# Patient Record
Sex: FEMALE | Race: WHITE | NOT HISPANIC OR LATINO | Employment: FULL TIME | URBAN - NONMETROPOLITAN AREA
[De-identification: names, ages, dates, MRNs, and addresses within clinical notes are randomized per-mention and may not be internally consistent; named-entity substitution may affect disease eponyms.]

---

## 2020-02-03 ENCOUNTER — TRANSCRIBE ORDERS (OUTPATIENT)
Dept: GENERAL RADIOLOGY | Facility: HOSPITAL | Age: 25
End: 2020-02-03

## 2020-02-03 ENCOUNTER — HOSPITAL ENCOUNTER (OUTPATIENT)
Dept: GENERAL RADIOLOGY | Facility: HOSPITAL | Age: 25
Discharge: HOME | End: 2020-02-03
Admitting: NURSE PRACTITIONER

## 2020-02-03 DIAGNOSIS — R07.9 CHEST PAIN, UNSPECIFIED TYPE: ICD-10-CM

## 2020-02-03 DIAGNOSIS — R07.9 CHEST PAIN, UNSPECIFIED TYPE: Primary | ICD-10-CM

## 2020-02-03 PROCEDURE — 71046 X-RAY EXAM CHEST 2 VIEWS: CPT

## 2020-02-04 ENCOUNTER — TRANSCRIBE ORDERS (OUTPATIENT)
Dept: ADMINISTRATIVE | Facility: HOSPITAL | Age: 25
End: 2020-02-04

## 2020-02-04 DIAGNOSIS — R07.9 CHEST PAIN, UNSPECIFIED TYPE: Primary | ICD-10-CM

## 2021-04-01 ENCOUNTER — OFFICE VISIT (OUTPATIENT)
Dept: OBSTETRICS AND GYNECOLOGY | Facility: CLINIC | Age: 26
End: 2021-04-01

## 2021-04-01 VITALS
WEIGHT: 293 LBS | BODY MASS INDEX: 44.41 KG/M2 | DIASTOLIC BLOOD PRESSURE: 82 MMHG | HEIGHT: 68 IN | SYSTOLIC BLOOD PRESSURE: 142 MMHG

## 2021-04-01 DIAGNOSIS — L70.9 ACNE, UNSPECIFIED ACNE TYPE: ICD-10-CM

## 2021-04-01 DIAGNOSIS — Z01.419 WELL WOMAN EXAM WITH ROUTINE GYNECOLOGICAL EXAM: Primary | ICD-10-CM

## 2021-04-01 DIAGNOSIS — E55.9 VITAMIN D DEFICIENCY: ICD-10-CM

## 2021-04-01 DIAGNOSIS — N91.2 AMENORRHEA: ICD-10-CM

## 2021-04-01 DIAGNOSIS — L68.0 FEMALE HIRSUTISM: ICD-10-CM

## 2021-04-01 PROCEDURE — 99213 OFFICE O/P EST LOW 20 MIN: CPT | Performed by: NURSE PRACTITIONER

## 2021-04-01 PROCEDURE — 99395 PREV VISIT EST AGE 18-39: CPT | Performed by: NURSE PRACTITIONER

## 2021-04-01 PROCEDURE — G0123 SCREEN CERV/VAG THIN LAYER: HCPCS | Performed by: NURSE PRACTITIONER

## 2021-04-01 NOTE — PROGRESS NOTES
"Bruce Del Castillo is a 25 y.o. female    Patient is here today as a new patient.  She has not had a period since October.  She denies being sexually active.  She has never had a Pap smear on exam.  She states her periods have always been irregular but never this long in between.    Gynecologic Exam  The patient's pertinent negatives include no pelvic pain, vaginal bleeding or vaginal discharge. The patient is experiencing no pain. Pertinent negatives include no abdominal pain, anorexia, back pain, chills, constipation, diarrhea, discolored urine, dysuria, fever, flank pain, frequency, headaches, hematuria, joint pain, joint swelling, nausea, painful intercourse, rash, sore throat, urgency or vomiting. She is not sexually active. Her menstrual history has been irregular.         /82   Ht 172.7 cm (68\")   Wt (!) 138 kg (305 lb)   LMP 10/01/2020 (Approximate)   Breastfeeding No   BMI 46.38 kg/m²     Outpatient Encounter Medications as of 4/1/2021   Medication Sig Dispense Refill   • escitalopram (LEXAPRO) 20 MG tablet Take 1 tablet by mouth daily 30 tablet 2   • [DISCONTINUED] escitalopram (LEXAPRO) 20 MG tablet Take 1 tablet by mouth daily 30 tablet 3   • [DISCONTINUED] escitalopram (LEXAPRO) 20 MG tablet Take 1 tablet by mouth daily 30 tablet 3     No facility-administered encounter medications on file as of 4/1/2021.       Surgical History  Past Surgical History:   Procedure Laterality Date   • NASAL SINUS SURGERY      as a child       Family History  Family History   Problem Relation Age of Onset   • Diabetes Father    • Hypertension Mother    • Breast cancer Neg Hx    • Ovarian cancer Neg Hx    • Uterine cancer Neg Hx    • Colon cancer Neg Hx    • Melanoma Neg Hx    • Stroke Neg Hx    • Osteoporosis Neg Hx    • Prostate cancer Neg Hx        The following portions of the patient's history were reviewed and updated as appropriate: allergies, current medications, past family history, past " medical history, past social history, past surgical history and problem list.    Review of Systems   Constitutional: Positive for unexpected weight gain. Negative for activity change, appetite change, chills, diaphoresis, fatigue, fever and unexpected weight loss.   HENT: Negative for congestion, dental problem, drooling, ear discharge, ear pain, facial swelling, hearing loss, mouth sores, nosebleeds, postnasal drip, rhinorrhea, sinus pressure, sneezing, sore throat, swollen glands, tinnitus, trouble swallowing and voice change.    Eyes: Negative for blurred vision, double vision, photophobia, pain, discharge, redness, itching and visual disturbance.   Respiratory: Negative for apnea, cough, choking, chest tightness, shortness of breath, wheezing and stridor.    Cardiovascular: Negative for chest pain, palpitations and leg swelling.   Gastrointestinal: Negative for abdominal distention, abdominal pain, anal bleeding, anorexia, blood in stool, constipation, diarrhea, nausea, rectal pain, vomiting, GERD and indigestion.   Endocrine: Negative for cold intolerance, heat intolerance, polydipsia, polyphagia and polyuria.   Genitourinary: Positive for menstrual problem. Negative for amenorrhea, breast discharge, breast lump, breast pain, decreased libido, decreased urine volume, difficulty urinating, dyspareunia, dysuria, flank pain, frequency, genital sores, hematuria, pelvic pain, pelvic pressure, urgency, urinary incontinence, vaginal bleeding, vaginal discharge and vaginal pain.   Musculoskeletal: Negative for arthralgias, back pain, gait problem, joint pain, joint swelling, myalgias, neck pain, neck stiffness and bursitis.   Skin: Negative for color change, dry skin, pallor, rash, skin lesions and bruise.   Allergic/Immunologic: Negative for environmental allergies, food allergies and immunocompromised state.   Neurological: Negative for dizziness, tremors, seizures, syncope, facial asymmetry, speech difficulty,  weakness, light-headedness, numbness, headache, memory problem and confusion.   Hematological: Negative for adenopathy. Does not bruise/bleed easily.   Psychiatric/Behavioral: Negative for agitation, behavioral problems, decreased concentration, dysphoric mood, hallucinations, self-injury, sleep disturbance, suicidal ideas, negative for hyperactivity, depressed mood and stress. The patient is not nervous/anxious.        Objective   Physical Exam  Vitals and nursing note reviewed. Exam conducted with a chaperone present.   Constitutional:       General: She is not in acute distress.     Appearance: She is well-developed. She is not diaphoretic.   HENT:      Head: Normocephalic.      Right Ear: External ear normal.      Left Ear: External ear normal.      Nose: Nose normal.   Eyes:      General: No scleral icterus.        Right eye: No discharge.         Left eye: No discharge.      Conjunctiva/sclera: Conjunctivae normal.      Pupils: Pupils are equal, round, and reactive to light.   Neck:      Thyroid: No thyromegaly.      Vascular: No carotid bruit.      Trachea: No tracheal deviation.   Cardiovascular:      Rate and Rhythm: Normal rate and regular rhythm.      Heart sounds: Normal heart sounds. No murmur heard.     Pulmonary:      Effort: Pulmonary effort is normal. No respiratory distress.      Breath sounds: Normal breath sounds. No wheezing.   Chest:      Breasts: Breasts are symmetrical.         Right: Normal. No swelling, bleeding, inverted nipple, mass, nipple discharge, skin change or tenderness.         Left: Normal. No swelling, bleeding, inverted nipple, mass, nipple discharge, skin change or tenderness.   Abdominal:      General: There is no distension.      Palpations: Abdomen is soft. There is no mass.      Tenderness: There is no abdominal tenderness. There is no right CVA tenderness, left CVA tenderness or guarding.      Hernia: No hernia is present. There is no hernia in the left inguinal area or  right inguinal area.   Genitourinary:     General: Normal vulva.      Exam position: Lithotomy position.      Labia:         Right: No rash, tenderness, lesion or injury.         Left: No rash, tenderness, lesion or injury.       Vagina: Normal. No signs of injury and foreign body. No vaginal discharge, erythema, tenderness or bleeding.      Cervix: Normal.      Uterus: Normal. Not enlarged, not fixed and not tender.       Adnexa: Right adnexa normal and left adnexa normal.        Right: No mass, tenderness or fullness.          Left: No mass, tenderness or fullness.        Rectum: Normal. No mass.      Comments:   BSU normal  Urethral meatus  Normal  Perineum  Normal  Musculoskeletal:         General: No tenderness. Normal range of motion.      Cervical back: Normal range of motion and neck supple.   Lymphadenopathy:      Head:      Right side of head: No submental, submandibular, tonsillar, preauricular, posterior auricular or occipital adenopathy.      Left side of head: No submental, submandibular, tonsillar, preauricular, posterior auricular or occipital adenopathy.      Cervical: No cervical adenopathy.      Right cervical: No superficial, deep or posterior cervical adenopathy.     Left cervical: No superficial, deep or posterior cervical adenopathy.      Upper Body:      Right upper body: No supraclavicular, axillary or pectoral adenopathy.      Left upper body: No supraclavicular, axillary or pectoral adenopathy.      Lower Body: No right inguinal adenopathy. No left inguinal adenopathy.   Skin:     General: Skin is warm and dry.      Findings: No bruising, erythema or rash.          Neurological:      Mental Status: She is alert and oriented to person, place, and time.      Coordination: Coordination normal.   Psychiatric:         Mood and Affect: Mood normal.         Behavior: Behavior normal.         Thought Content: Thought content normal.         Judgment: Judgment normal.         Assessment/Plan      Diagnoses and all orders for this visit:    1. Well woman exam with routine gynecological exam (Primary)  Normal GYN exam. Will RTO for lab work. Encouraged SBE, pt is aware how to do self breast exam and the importance of same. Discussed weight management and importance of maintaining a healthy weight. Discussed Vitamin D intake and the importance of adequate vitamin D for both Bone Health and a healthy immune system.  Discussed Daily exercise and the importance of same, in regards to a healthy heart as well as helping to maintain her weight and improving her mental health.  BMI 46.4.  Pap smear is done per ASCCP guidelines.  -     Liquid-based Pap Smear, Screening  -     CBC & Differential  -     Lipid Panel With LDL / HDL Ratio  -     Comprehensive Metabolic Panel  -     TSH  -     T3, Uptake  -     T4, Free  -     Hemoglobin A1c  -     Urine Culture - , Urine, Clean Catch  -     UA / M With / Rflx Culture([facility] ONLY) - Urine, Clean Catch  -     Hepatitis C Antibody    2. Vitamin D deficiency  Comments:  Patient will return for blood work.  Orders:  -     Vitamin D 25 Hydroxy    3. Amenorrhea  Comments:  Will return for fasting lab work and will also have pelvic ultrasound done.  Orders:  -     Insulin, Total  -     Follicle Stimulating Hormone  -     Luteinizing Hormone  -     Cortisol  -     DHEA-Sulfate  -     Testosterone  -     Progesterone  -     Estradiol    4. Acne, unspecified acne type  Comments:  She has significant cystic acne.  She will return for blood work.  We briefly discussed Aldactone and metformin.    5. Female hirsutism  Comments:  Patient has significant hirsutism in her neck and face, as well as her arms.         Patient's Body mass index is 46.38 kg/m². BMI is above normal parameters. Recommendations include: educational material, exercise counseling and nutrition counseling.      Mary Grace Costello, APRN  4/1/2021

## 2021-04-01 NOTE — PATIENT INSTRUCTIONS
"BMI for Adults  What is BMI?  Body mass index (BMI) is a number that is calculated from a person's weight and height. BMI can help estimate how much of a person's weight is composed of fat. BMI does not measure body fat directly. Rather, it is an alternative to procedures that directly measure body fat, which can be difficult and expensive.  BMI can help identify people who may be at higher risk for certain medical problems.  What are BMI measurements used for?  BMI is used as a screening tool to identify possible weight problems. It helps determine whether a person is obese, overweight, a healthy weight, or underweight.  BMI is useful for:  · Identifying a weight problem that may be related to a medical condition or may increase the risk for medical problems.  · Promoting changes, such as changes in diet and exercise, to help reach a healthy weight. BMI screening can be repeated to see if these changes are working.  How is BMI calculated?  BMI involves measuring your weight in relation to your height. Both height and weight are measured, and the BMI is calculated from those numbers. This can be done either in English ([place] or metric measurements. Note that charts and online BMI calculators are available to help you find your BMI quickly and easily without having to do these calculations yourself.  To calculate your BMI in English ([place] measurements:    1. Measure your weight in pounds (lb).  2. Multiply the number of pounds by 703.  ? For example, for a person who weighs 180 lb, multiply that number by 703, which equals 126,540.  3. Measure your height in inches. Then multiply that number by itself to get a measurement called \"inches squared.\"  ? For example, for a person who is 70 inches tall, the \"inches squared\" measurement is 70 inches x 70 inches, which equals 4,900 inches squared.  4. Divide the total from step 2 (number of lb x 703) by the total from step 3 (inches squared): 126,540 ÷ 4,900 = 25.8. This is " "your BMI.  To calculate your BMI in metric measurements:  1. Measure your weight in kilograms (kg).  2. Measure your height in meters (m). Then multiply that number by itself to get a measurement called \"meters squared.\"  ? For example, for a person who is 1.75 m tall, the \"meters squared\" measurement is 1.75 m x 1.75 m, which is equal to 3.1 meters squared.  3. Divide the number of kilograms (your weight) by the meters squared number. In this example: 70 ÷ 3.1 = 22.6. This is your BMI.  What do the results mean?  BMI charts are used to identify whether you are underweight, normal weight, overweight, or obese. The following guidelines will be used:  · Underweight: BMI less than 18.5.  · Normal weight: BMI between 18.5 and 24.9.  · Overweight: BMI between 25 and 29.9.  · Obese: BMI of 30 or above.  Keep these notes in mind:  · Weight includes both fat and muscle, so someone with a muscular build, such as an athlete, may have a BMI that is higher than 24.9. In cases like these, BMI is not an accurate measure of body fat.  · To determine if excess body fat is the cause of a BMI of 25 or higher, further assessments may need to be done by a health care provider.  · BMI is usually interpreted in the same way for men and women.  Where to find more information  For more information about BMI, including tools to quickly calculate your BMI, go to these websites:  · Centers for Disease Control and Prevention: www.cdc.gov  · American Heart Association: www.heart.org  · National Heart, Lung, and Blood [place]: www.nhlbi.nih.gov  Summary  · Body mass index (BMI) is a number that is calculated from a person's weight and height.  · BMI may help estimate how much of a person's weight is composed of fat. BMI can help identify those who may be at higher risk for certain medical problems.  · BMI can be measured using English measurements or metric measurements.  · BMI charts are used to identify whether you are underweight, normal " weight, overweight, or obese.  This information is not intended to replace advice given to you by your health care provider. Make sure you discuss any questions you have with your health care provider.  Document Revised: 09/09/2020 Document Reviewed: 07/17/2020  [employer] Patient Education © 2021 [employer]

## 2021-04-05 LAB
GEN CATEG CVX/VAG CYTO-IMP: NORMAL
LAB AP CASE REPORT: NORMAL
LAB AP GYN ADDITIONAL INFORMATION: NORMAL
PATH INTERP SPEC-IMP: NORMAL
STAT OF ADQ CVX/VAG CYTO-IMP: NORMAL

## 2021-04-07 PROCEDURE — U0004 COV-19 TEST NON-CDC HGH THRU: HCPCS | Performed by: FAMILY MEDICINE

## 2021-04-09 ENCOUNTER — TELEPHONE (OUTPATIENT)
Dept: URGENT CARE | Facility: CLINIC | Age: 26
End: 2021-04-09

## 2021-04-19 PROCEDURE — 80053 COMPREHEN METABOLIC PANEL: CPT | Performed by: FAMILY MEDICINE

## 2021-04-19 PROCEDURE — 85025 COMPLETE CBC W/AUTO DIFF WBC: CPT | Performed by: FAMILY MEDICINE

## 2021-04-19 PROCEDURE — 84466 ASSAY OF TRANSFERRIN: CPT | Performed by: FAMILY MEDICINE

## 2021-04-19 PROCEDURE — 82150 ASSAY OF AMYLASE: CPT | Performed by: FAMILY MEDICINE

## 2021-04-19 PROCEDURE — 83690 ASSAY OF LIPASE: CPT | Performed by: FAMILY MEDICINE

## 2021-04-19 PROCEDURE — 83540 ASSAY OF IRON: CPT | Performed by: FAMILY MEDICINE

## 2021-04-20 ENCOUNTER — OFFICE VISIT (OUTPATIENT)
Dept: FAMILY MEDICINE CLINIC | Facility: CLINIC | Age: 26
End: 2021-04-20

## 2021-04-20 VITALS
WEIGHT: 293 LBS | OXYGEN SATURATION: 99 % | TEMPERATURE: 98 F | HEIGHT: 68 IN | HEART RATE: 89 BPM | SYSTOLIC BLOOD PRESSURE: 120 MMHG | DIASTOLIC BLOOD PRESSURE: 80 MMHG | BODY MASS INDEX: 44.41 KG/M2

## 2021-04-20 DIAGNOSIS — J35.1 TONSILLAR HYPERTROPHY: ICD-10-CM

## 2021-04-20 DIAGNOSIS — R79.0 ABNORMAL IRON SATURATION: ICD-10-CM

## 2021-04-20 DIAGNOSIS — R42 DIZZINESS: ICD-10-CM

## 2021-04-20 DIAGNOSIS — R53.83 FATIGUE, UNSPECIFIED TYPE: ICD-10-CM

## 2021-04-20 DIAGNOSIS — E66.01 MORBID OBESITY WITH BODY MASS INDEX OF 45.0-49.9 IN ADULT (HCC): ICD-10-CM

## 2021-04-20 DIAGNOSIS — Z83.3 FAMILY HISTORY OF DIABETES MELLITUS IN FATHER: ICD-10-CM

## 2021-04-20 DIAGNOSIS — R11.0 NAUSEA: ICD-10-CM

## 2021-04-20 DIAGNOSIS — J31.0 RHINITIS, CHRONIC: ICD-10-CM

## 2021-04-20 DIAGNOSIS — F41.9 ANXIETY AND DEPRESSION: Primary | ICD-10-CM

## 2021-04-20 DIAGNOSIS — F32.A ANXIETY AND DEPRESSION: Primary | ICD-10-CM

## 2021-04-20 LAB
IRON 24H UR-MRATE: 45 MCG/DL (ref 37–145)
IRON SATN MFR SERPL: 11 % (ref 20–50)
TIBC SERPL-MCNC: 422 MCG/DL (ref 298–536)
TRANSFERRIN SERPL-MCNC: 283 MG/DL (ref 200–360)

## 2021-04-20 PROCEDURE — 99214 OFFICE O/P EST MOD 30 MIN: CPT | Performed by: FAMILY MEDICINE

## 2021-04-20 RX ORDER — FLUTICASONE PROPIONATE 50 MCG
2 SPRAY, SUSPENSION (ML) NASAL DAILY
Qty: 16 G | Refills: 2 | Status: SHIPPED | OUTPATIENT
Start: 2021-04-20

## 2021-04-20 NOTE — PROGRESS NOTES
Chief Complaint   Patient presents with   • Establish Care   • Nausea     onset 2 weeks   • Vomiting       History:  Gemma Del Castillo is a 25 y.o. female who comes in today for the issues listed above.  New pt at this clinic.   Nausea was intermittent and has become more every day in the past two weeks.  Vomiting started early April while on menses or just finishing up her period.  Saw Dr. Parker at  4/7/21 and then saw him again yesterday.  Being sick has caused her to have her work threaten her about getting fired.  This has added to her stress.  Worsened stress has made her have more GI upset.      Last vomiting was yesterday, dry heaving quality because she didn't eat that much.  Crackers will usually stay down.  Greasy stuff will worsen sx.  Not much in the way of abd pain in the past 2 weeks.    Meds reviewed with her today.  Has been on Lexapro for a year or two.  Still has anxiety spikes and depression is a little better with the lexapro. Lexapro doesn't make her nauseated; she is erratic on taking it though and sometimes it makes her more anxious if she is taking it.  She was on 50 mg Zoloft before that and it didn't help or gave her a bad reaction where she phonophobic almost.  May have some underlying tinnitus she thinks.    Zofran ODT is helping her keeping water down.    Hasn't started Protonix, which was rx'd yesterday at .    Has a lot of GERD sx mid chest.      Not a smoker; last ETOH intake was New Year's two years ago.      BP is good today but she has been told it's been high episodically in the past.  Never been on any meds for BP or been dx'd with HTN.    Mom takes meds for mood; pt unsure of what.  PU has schizophrenia.      Father has had DM as long as she can remember and has really severe asthma and is on steroids and breathing tx.    Pt denies BG issues.      She denies pregnancy and is not sexually active.  Menses come irregularly and had one in April but last before was that in  October.  She is going to [facility] at [facility] and may have PCOS and is going to have US tomorrow and a huge panel of labs which we reviewed together today.    Pt is unaware of any AR or food allergies.         Old records reviewed:   with Og Parker MD (04/07/2021) - N/V x 2 days, dx viral syndrome, COVID neg; /70   with Og Parker MD (04/19/2021) - GERD and intractable vomiting    Labs reviewed:  Yesterday at  had normal amylase and lipase, normal CBC, CMP had , normal values except for low creatinine of 0.51. UA normal.    Imaging/other tests reviewed:  XR Chest 2 View (02/03/2020 13:08)       HISTORY: Chest pain     CXR: 2 views the chest are obtained.     COMPARISON: 12/24/2012     FINDINGS: There is a slightly diminished level of inspiration with no  focal consolidation or edema. Cardiac mediastinal contours are normal.  No pleural effusion or pneumothorax. Calcified granuloma of the right  middle lobe. No pleural effusion or pneumothorax. No acute bony  pathology.     IMPRESSION:  1. No acute radiographic cardiopulmonary process.       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  Will start there tomorrow afternoon.  Was seeing a psychiatrist at [facility]; Jose Jaramillo was prescribing it.      ROS:  Review of Systems   Constitutional: Positive for fatigue. Negative for fever.   HENT: Negative for sore throat, swollen glands and trouble swallowing.         No dental problems    She has intermittent sensitivity to noices and tinnitus hum quality and she listens to loud music on headphones a lot.      Says hearing isn't so good.   Respiratory: Negative for shortness of breath.    Cardiovascular: Negative for palpitations.   Gastrointestinal: Positive for GERD. Negative for constipation and diarrhea.   Genitourinary: Negative for difficulty urinating.        + irreg menses   Neurological: Positive for dizziness.        Has some  "dysequalibrium.     Psychiatric/Behavioral: Positive for stress. The patient is nervous/anxious.         Poor sleep patterns, stays up too late, writing for pleasure, stories mostly.  May be fatigued due to that.    Talks in her sleep, not sure she snores       No Known Allergies    Past Medical History:   Diagnosis Date   • Anxiety    • Depression    • Hypertension        Past Surgical History:   Procedure Laterality Date   • NASAL SINUS SURGERY      as a child       Family History   Problem Relation Age of Onset   • Diabetes Father    • Hypertension Mother    • Arthritis Mother    • Depression Mother    • Breast cancer Neg Hx    • Ovarian cancer Neg Hx    • Uterine cancer Neg Hx    • Colon cancer Neg Hx    • Melanoma Neg Hx    • Stroke Neg Hx    • Osteoporosis Neg Hx    • Prostate cancer Neg Hx        Gemma  reports that she has never smoked. She has never used smokeless tobacco. She reports that she does not drink alcohol and does not use drugs.    Outpatient Medications Prior to Visit   Medication Sig Dispense Refill   • escitalopram (LEXAPRO) 20 MG tablet Take 1 tablet by mouth daily 30 tablet 2   • ondansetron ODT (ZOFRAN-ODT) 8 MG disintegrating tablet Place 1 tablet on the tongue Every 8 (Eight) Hours As Needed for Nausea or Vomiting for up to 7 days. 15 tablet 0   • pantoprazole (PROTONIX) 40 MG EC tablet Take 1 tablet by mouth Daily for 30 days. 30 tablet 0     No facility-administered medications prior to visit.        OBJECTIVE:  /80 (BP Location: Left arm, Patient Position: Sitting, Cuff Size: Adult)   Pulse 89   Temp 98 °F (36.7 °C) (Temporal)   Ht 172.7 cm (68\")   Wt (!) 137 kg (302 lb 12.8 oz)   LMP 04/04/2021   SpO2 99%   BMI 46.04 kg/m²      Physical Exam  Vitals reviewed.   Constitutional:       Comments: Diffusely morbidly obese WD,WN WG WF   HENT:      Head: Normocephalic.      Right Ear: Ear canal and external ear normal.      Left Ear: Ear canal and external ear normal.      " Ears:      Comments: TM's normal color but dull and slt retracted B     Nose:      Comments: Swollen turbinates B, pink-purple color, wet watery to medium-consistency colorless drainage B     Mouth/Throat:      Mouth: Mucous membranes are moist.      Pharynx: Oropharynx is clear. No oropharyngeal exudate.      Comments: Wide uvula and large tonsils B; mildly crowded posterior OP, no PND  Eyes:      General: No scleral icterus.        Right eye: No discharge.         Left eye: No discharge.      Extraocular Movements: Extraocular movements intact.      Conjunctiva/sclera: Conjunctivae normal.      Comments: Lids normal   Neck:      Comments: Normal thyroid.  SM LNs are easily palpable but not TTP; likely from large tonsils  Cardiovascular:      Rate and Rhythm: Normal rate and regular rhythm.      Pulses: Normal pulses.   Pulmonary:      Effort: Pulmonary effort is normal. No respiratory distress.      Breath sounds: Normal breath sounds. No wheezing.   Abdominal:      General: Bowel sounds are normal.      Palpations: There is no mass.      Tenderness: There is no abdominal tenderness. There is no guarding.      Comments: Obese soft abd; no HSM   Musculoskeletal:      Cervical back: Neck supple. No tenderness.   Skin:     General: Skin is warm.      Comments: Slt pale/fair complected   Neurological:      General: No focal deficit present.      Mental Status: She is alert and oriented to person, place, and time.      Motor: No tremor.      Coordination: Coordination is intact.      Gait: Gait normal.   Psychiatric:         Attention and Perception: Attention normal.         Mood and Affect: Mood normal.         Speech: Speech normal.         Behavior: Behavior normal. Behavior is cooperative.         Cognition and Memory: Cognition normal.      Comments: Low energy affect but not sluggish or somnolent         Assessment/Plan    Diagnoses and all orders for this visit:    1. Anxiety and depression (Primary)    2.  Dizziness  -     Iron and TIBC  -     fluticasone (Flonase) 50 MCG/ACT nasal spray; Use 2 sprays into the nostril(s) as directed by provider Daily. Use 2 sprays on each side  Dispense: 16 g; Refill: 2    3. Fatigue, unspecified type  -     Iron and TIBC    4. Rhinitis, chronic  -     fluticasone (Flonase) 50 MCG/ACT nasal spray; Use 2 sprays into the nostril(s) as directed by provider Daily. Use 2 sprays on each side  Dispense: 16 g; Refill: 2    5. Tonsillar hypertrophy    6. Morbid obesity with body mass index of 45.0-49.9 in adult (CMS/[facility])    7. Family history of diabetes mellitus in father    8. Abnormal iron saturation    9. Nausea       Iron and TIBC (04/19/2021 08:29)    Labs are back:     Component   Ref Range & Units 1 d ago   Iron   37 - 145 mcg/dL 45    Iron Saturation   20 - 50 % 11Low     Transferrin   200 - 360 mg/dL 283    TIBC   298 - 536 mcg/dL 422                Patient's Body mass index is 46.04 kg/m². BMI is above normal parameters. Recommendations include: exercise counseling.         MDM  Number of Diagnoses or Management Options  New dx: all new to me  Est dx: none     Amount and/or Complexity of Data Reviewed  Labs: yes  Records: yes  X-rays/imaging: yes  Other tests: no     Risk of Complications, Morbidity, and/or Mortality  Presenting problems: moderate      Diagnostic procedures: will add iron panel on to the blood drawn yesterday at the  to see if low iron level could be contributing to her fatigue.  Stress and poor sleep hours could be adding to it as well.    Management options: Continue Rx lexapro for now.  Could consider reduction in dose and addition of wellbutrin for more energy v. Change to effexor; will discuss further at next visit    Rx Flonase should help the dysequalibrium, rhinitis, and possibly breathing at night. Breathing better may make her less nauseated.    After Visit Summary was completed today for the patient.      Return in about 4 weeks (around 5/18/2021) for  recheck nausea, psych and GYN recommendations, recheck Flonase, consider alternate mood med.      The following instructions were given to the patient:  - Start Protonix and track to see if nausea and GERD is better  - Start more iron-rich foods ( defer iron pills due to pre-existing GI upset, N/V)-  Read the handout  - See GYN tomorrow for PCOS work up with labs and US  - Start flonase nasal spray as instructed once a day  - Exercise to improve obesity, sleeping and mood.  - Find a less stressful job; okay to be off work this week.  Note for work given.      Handouts given:  - Iron Rich Foods      Mona Tavarez M.D.  Family Medicine

## 2021-04-20 NOTE — PATIENT INSTRUCTIONS
Iron-Rich Diet    Iron is a mineral that helps your body to produce hemoglobin. Hemoglobin is a protein in red blood cells that carries oxygen to your body's tissues. Eating too little iron may cause you to feel weak and tired, and it can increase your risk of infection. Iron is naturally found in many foods, and many foods have iron added to them (iron-fortified foods).  You may need to follow an iron-rich diet if you do not have enough iron in your body due to certain medical conditions. The amount of iron that you need each day depends on your age, your sex, and any medical conditions you have. Follow instructions from your health care provider or a diet and nutrition specialist (dietitian) about how much iron you should eat each day.  What are tips for following this plan?  Reading food labels  · Check food labels to see how many milligrams (mg) of iron are in each serving.  Cooking  · Cook foods in pots and pans that are made from iron.  · Take these steps to make it easier for your body to absorb iron from certain foods:  ? Soak beans overnight before cooking.  ? Soak whole grains overnight and drain them before using.  ? Ferment flours before baking, such as by using yeast in bread dough.  Meal planning  · When you eat foods that contain iron, you should eat them with foods that are high in vitamin C. These include oranges, peppers, tomatoes, potatoes, and amadeo. Vitamin C helps your body to absorb iron.  General information  · Take iron supplements only as told by your health care provider. An overdose of iron can be life-threatening. If you were prescribed iron supplements, take them with orange juice or a vitamin C supplement.  · When you eat iron-fortified foods or take an iron supplement, you should also eat foods that naturally contain iron, such as meat, poultry, and fish. Eating naturally iron-rich foods helps your body to absorb the iron that is added to other foods or contained in a  supplement.  · Certain foods and drinks prevent your body from absorbing iron properly. Avoid eating these foods in the same meal as iron-rich foods or with iron supplements. These foods include:  ? Coffee, black tea, and red wine.  ? Milk, dairy products, and foods that are high in calcium.  ? Beans and soybeans.  ? Whole grains.  What foods should I eat?  Fruits  Prunes. Raisins.  Eat fruits high in vitamin C, such as oranges, grapefruits, and strawberries, alongside iron-rich foods.  Vegetables  Spinach (cooked). Green peas. Broccoli. Fermented vegetables.  Eat vegetables high in vitamin C, such as leafy greens, potatoes, bell peppers, and tomatoes, alongside iron-rich foods.  Grains  Iron-fortified breakfast cereal. Iron-fortified whole-wheat bread. Enriched rice. Sprouted grains.  Meats and other proteins  Beef liver. Oysters. Beef. Shrimp. Turkey. Chicken. Tuna. Sardines. Chickpeas. Nuts. Tofu. Pumpkin seeds.  Beverages  Tomato juice. Fresh orange juice. Prune juice. Hibiscus tea. Fortified instant breakfast shakes.  Sweets and desserts  Blackstrap molasses.  Seasonings and condiments  Tahini. Fermented soy sauce.  Other foods  Wheat germ.  The items listed above may not be a complete list of recommended foods and beverages. Contact a dietitian for more information.  What foods should I avoid?  Grains  Whole grains. Bran cereal. Bran flour. Oats.  Meats and other proteins  Soybeans. Products made from soy protein. Black beans. Lentils. Mung beans. Split peas.  Dairy  Milk. Cream. Cheese. Yogurt. Cottage cheese.  Beverages  Coffee. Black tea. Red wine.  Sweets and desserts  Cocoa. Chocolate. Ice cream.  Other foods  Basil. Oregano. Large amounts of parsley.  The items listed above may not be a complete list of foods and beverages to avoid. Contact a dietitian for more information.  Summary  · Iron is a mineral that helps your body to produce hemoglobin. Hemoglobin is a protein in red blood cells that carries  oxygen to your body's tissues.  · Iron is naturally found in many foods, and many foods have iron added to them (iron-fortified foods).  · When you eat foods that contain iron, you should eat them with foods that are high in vitamin C. Vitamin C helps your body to absorb iron.  · Certain foods and drinks prevent your body from absorbing iron properly, such as whole grains and dairy products. You should avoid eating these foods in the same meal as iron-rich foods or with iron supplements.  This information is not intended to replace advice given to you by your health care provider. Make sure you discuss any questions you have with your health care provider.  Document Revised: 11/30/2018 Document Reviewed: 11/13/2018  [employer] Patient Education © 2021 [employer]

## 2021-04-21 ENCOUNTER — TELEMEDICINE (OUTPATIENT)
Dept: OBSTETRICS AND GYNECOLOGY | Facility: CLINIC | Age: 26
End: 2021-04-21

## 2021-04-21 ENCOUNTER — TELEPHONE (OUTPATIENT)
Dept: OBSTETRICS AND GYNECOLOGY | Facility: CLINIC | Age: 26
End: 2021-04-21

## 2021-04-21 VITALS
HEIGHT: 68 IN | BODY MASS INDEX: 44.41 KG/M2 | WEIGHT: 293 LBS | SYSTOLIC BLOOD PRESSURE: 138 MMHG | DIASTOLIC BLOOD PRESSURE: 71 MMHG

## 2021-04-21 DIAGNOSIS — N93.8 DUB (DYSFUNCTIONAL UTERINE BLEEDING): Primary | ICD-10-CM

## 2021-04-21 PROCEDURE — 99213 OFFICE O/P EST LOW 20 MIN: CPT | Performed by: NURSE PRACTITIONER

## 2021-04-21 NOTE — PROGRESS NOTES
"Subjective     Gemma Del Castillo is a 25 y.o. female    You have chosen to receive care through a telehealth visit.  Do you consent to use a video/audio connection for your medical care today? Yes    Patient called today to discuss her pelvic ultrasound that was done in the office today.  She saw Dr. Tavarez yesterday and had labs drawn at her office.  They also did the labs that I had ordered but I do not have those results back yet.          Menstrual Problem  This is a recurrent problem. The current episode started more than 1 year ago. The problem occurs intermittently. The problem has been unchanged. Pertinent negatives include no abdominal pain, anorexia, arthralgias, change in bowel habit, chest pain, chills, congestion, coughing, diaphoresis, fatigue, fever, headaches, joint swelling, myalgias, nausea, neck pain, numbness, rash, sore throat, swollen glands, urinary symptoms, vertigo, visual change, vomiting or weakness. Nothing aggravates the symptoms. She has tried nothing for the symptoms.         /71   Ht 172.7 cm (68\")   Wt (!) 137 kg (302 lb)   LMP 04/04/2021   BMI 45.92 kg/m²     Outpatient Encounter Medications as of 4/21/2021   Medication Sig Dispense Refill   • escitalopram (LEXAPRO) 20 MG tablet Take 1 tablet by mouth daily 30 tablet 2   • fluticasone (Flonase) 50 MCG/ACT nasal spray Use 2 sprays into the nostril(s) as directed by provider Daily. Use 2 sprays on each side 16 g 2   • ondansetron ODT (ZOFRAN-ODT) 8 MG disintegrating tablet Place 1 tablet on the tongue Every 8 (Eight) Hours As Needed for Nausea or Vomiting for up to 7 days. 15 tablet 0   • pantoprazole (PROTONIX) 40 MG EC tablet Take 1 tablet by mouth Daily for 30 days. 30 tablet 0     No facility-administered encounter medications on file as of 4/21/2021.       Surgical History  Past Surgical History:   Procedure Laterality Date   • NASAL SINUS SURGERY      as a child       Family History  Family History   Problem Relation Age of " Onset   • Diabetes Father    • Hypertension Mother    • Arthritis Mother    • Depression Mother    • Breast cancer Neg Hx    • Ovarian cancer Neg Hx    • Uterine cancer Neg Hx    • Colon cancer Neg Hx    • Melanoma Neg Hx    • Stroke Neg Hx    • Osteoporosis Neg Hx    • Prostate cancer Neg Hx        The following portions of the patient's history were reviewed and updated as appropriate: allergies, current medications, past family history, past medical history, past social history, past surgical history and problem list.    Review of Systems   Constitutional: Negative for activity change, appetite change, chills, diaphoresis, fatigue, fever, unexpected weight gain and unexpected weight loss.   HENT: Negative for congestion, dental problem, drooling, ear discharge, ear pain, facial swelling, hearing loss, mouth sores, nosebleeds, postnasal drip, rhinorrhea, sinus pressure, sneezing, sore throat, swollen glands, tinnitus, trouble swallowing and voice change.    Eyes: Negative for blurred vision, double vision, photophobia, pain, discharge, redness, itching and visual disturbance.   Respiratory: Negative for apnea, cough, choking, chest tightness, shortness of breath, wheezing and stridor.    Cardiovascular: Negative for chest pain, palpitations and leg swelling.   Gastrointestinal: Negative for abdominal distention, abdominal pain, anal bleeding, anorexia, blood in stool, change in bowel habit, constipation, diarrhea, nausea, rectal pain, vomiting, GERD and indigestion.   Endocrine: Negative for cold intolerance, heat intolerance, polydipsia, polyphagia and polyuria.   Genitourinary: Positive for menstrual problem. Negative for amenorrhea, breast discharge, breast lump, breast pain, decreased libido, decreased urine volume, difficulty urinating, dyspareunia, dysuria, flank pain, frequency, genital sores, hematuria, pelvic pain, pelvic pressure, urgency, urinary incontinence, vaginal bleeding, vaginal discharge and  vaginal pain.   Musculoskeletal: Negative for arthralgias, back pain, gait problem, joint swelling, myalgias, neck pain, neck stiffness and bursitis.   Skin: Negative for color change, dry skin and rash.   Allergic/Immunologic: Negative for environmental allergies, food allergies and immunocompromised state.   Neurological: Negative for dizziness, vertigo, tremors, seizures, syncope, facial asymmetry, speech difficulty, weakness, light-headedness, numbness, headache, memory problem and confusion.   Hematological: Negative for adenopathy. Does not bruise/bleed easily.   Psychiatric/Behavioral: Negative for agitation, behavioral problems, decreased concentration, dysphoric mood, hallucinations, self-injury, sleep disturbance, suicidal ideas, negative for hyperactivity, depressed mood and stress. The patient is not nervous/anxious.        Objective   Physical Exam  Vitals and nursing note reviewed.   Constitutional:       Appearance: She is well-developed.   HENT:      Head: Normocephalic and atraumatic.   Eyes:      General:         Right eye: No discharge.         Left eye: No discharge.      Conjunctiva/sclera: Conjunctivae normal.   Neck:      Thyroid: No thyromegaly.   Pulmonary:      Effort: Pulmonary effort is normal.   Musculoskeletal:      Cervical back: Normal range of motion.   Skin:     General: Skin is warm and dry.   Neurological:      Mental Status: She is alert and oriented to person, place, and time.   Psychiatric:         Mood and Affect: Mood normal.         Behavior: Behavior normal.         Thought Content: Thought content normal.         Judgment: Judgment normal.         Assessment/Plan   Diagnoses and all orders for this visit:    1. DUB (dysfunctional uterine bleeding) (Primary)  Comments:  Pt has been having very irregular periods. She had an U/S today in our office that was normal. Labs were done yesterday, but I don't have results yet. I will let her know when those results are back and we  will start Metformin if indicated.        This was an audio and video enabled telemedicine encounter.  Patient's Body mass index is 45.92 kg/m². BMI is above normal parameters. Recommendations include: educational material, exercise counseling and nutrition counseling.      Mary Grace Costello, APRN  4/21/2021

## 2021-04-21 NOTE — TELEPHONE ENCOUNTER
Called pt to inform her that her labs were not drawn yesterday when she had labs from another provider draw. Pt is also to be made aware that she needs to come into office fasting to have labs drawn that Mary Grace ordered. Patient did not answer. LVM requesting call back.

## 2022-07-05 ENCOUNTER — OFFICE VISIT (OUTPATIENT)
Dept: OBSTETRICS AND GYNECOLOGY | Facility: CLINIC | Age: 27
End: 2022-07-05

## 2022-07-05 VITALS
WEIGHT: 293 LBS | DIASTOLIC BLOOD PRESSURE: 74 MMHG | SYSTOLIC BLOOD PRESSURE: 128 MMHG | HEIGHT: 68 IN | BODY MASS INDEX: 44.41 KG/M2

## 2022-07-05 DIAGNOSIS — E55.9 VITAMIN D DEFICIENCY: ICD-10-CM

## 2022-07-05 DIAGNOSIS — N93.8 DUB (DYSFUNCTIONAL UTERINE BLEEDING): Primary | ICD-10-CM

## 2022-07-05 PROCEDURE — 99213 OFFICE O/P EST LOW 20 MIN: CPT | Performed by: NURSE PRACTITIONER

## 2022-07-05 NOTE — PROGRESS NOTES
"Bruce Del Castillo is a 26 y.o. female    .Patient comes in today with complaint of irregular periods.  She was seen in April 2021 for yearly checkup.  She was supposed to come back for labs to check for PCOS at that time.  She has not returned.  She has not been sexually active.    Gynecologic Exam  The patient's primary symptoms include missed menses. The patient's pertinent negatives include no pelvic pain, vaginal bleeding or vaginal discharge. This is a recurrent problem. The current episode started more than 1 year ago. The problem occurs intermittently. The problem has been waxing and waning. The patient is experiencing no pain. Pertinent negatives include no abdominal pain, anorexia, back pain, chills, constipation, diarrhea, discolored urine, dysuria, fever, flank pain, frequency, headaches, hematuria, joint pain, joint swelling, nausea, painful intercourse, rash, sore throat, urgency or vomiting. She is not sexually active. Her menstrual history has been irregular.         /74   Ht 172.7 cm (68\")   Wt (!) 143 kg (316 lb)   LMP  (LMP Unknown)   Breastfeeding No   BMI 48.05 kg/m²     Outpatient Encounter Medications as of 7/5/2022   Medication Sig Dispense Refill   • fluticasone (Flonase) 50 MCG/ACT nasal spray Use 2 sprays into the nostril(s) as directed by provider Daily. Use 2 sprays on each side 16 g 2   • pantoprazole (PROTONIX) 40 MG EC tablet Take 1 tablet by mouth daily 90 tablet 0   • [DISCONTINUED] escitalopram (LEXAPRO) 20 MG tablet Take 1 tablet by mouth daily 30 tablet 2   • [DISCONTINUED] levocetirizine (XYZAL) 5 MG tablet Take 1 tablet by mouth daily 90 tablet 0   • [DISCONTINUED] fluticasone (FLONASE) 50 MCG/ACT nasal spray Spray 2 squirts in the nostrils daily 16 mL 2   • [DISCONTINUED] HYDROcodone-acetaminophen (NORCO) 5-325 MG per tablet Take 1 tablet by mouth every 6 hours as needed for pain. 12 tablet 0     No facility-administered encounter medications on file as of " 7/5/2022.       Surgical History  Past Surgical History:   Procedure Laterality Date   • NASAL SINUS SURGERY      as a child       Family History  Family History   Problem Relation Age of Onset   • Diabetes Father    • Hypertension Mother    • Arthritis Mother    • Depression Mother    • Breast cancer Neg Hx    • Ovarian cancer Neg Hx    • Uterine cancer Neg Hx    • Colon cancer Neg Hx    • Melanoma Neg Hx    • Stroke Neg Hx    • Osteoporosis Neg Hx    • Prostate cancer Neg Hx        The following portions of the patient's history were reviewed and updated as appropriate: allergies, current medications, past family history, past medical history, past social history, past surgical history, and problem list.    Review of Systems   Constitutional: Negative for activity change, appetite change, chills, diaphoresis, fatigue, fever, unexpected weight gain and unexpected weight loss.   HENT: Negative for congestion, dental problem, drooling, ear discharge, ear pain, facial swelling, hearing loss, mouth sores, nosebleeds, postnasal drip, rhinorrhea, sinus pressure, sneezing, sore throat, swollen glands, tinnitus, trouble swallowing and voice change.    Eyes: Negative for blurred vision, double vision, photophobia, pain, discharge, redness, itching and visual disturbance.   Respiratory: Negative for apnea, cough, choking, chest tightness, shortness of breath, wheezing and stridor.    Cardiovascular: Negative for chest pain, palpitations and leg swelling.   Gastrointestinal: Negative for abdominal distention, abdominal pain, anal bleeding, anorexia, blood in stool, constipation, diarrhea, nausea, rectal pain, vomiting, GERD and indigestion.   Endocrine: Negative for cold intolerance, heat intolerance, polydipsia, polyphagia and polyuria.   Genitourinary: Positive for menstrual problem and missed menses. Negative for amenorrhea, breast discharge, breast lump, breast pain, decreased libido, decreased urine volume, difficulty  urinating, dyspareunia, dysuria, flank pain, frequency, genital sores, hematuria, pelvic pain, pelvic pressure, urgency, urinary incontinence, vaginal bleeding, vaginal discharge and vaginal pain.   Musculoskeletal: Negative for arthralgias, back pain, gait problem, joint pain, joint swelling, myalgias, neck pain, neck stiffness and bursitis.   Skin: Negative for color change, dry skin and rash.   Allergic/Immunologic: Negative for environmental allergies, food allergies and immunocompromised state.   Neurological: Negative for dizziness, tremors, seizures, syncope, facial asymmetry, speech difficulty, weakness, light-headedness, numbness, headache, memory problem and confusion.   Hematological: Negative for adenopathy. Does not bruise/bleed easily.   Psychiatric/Behavioral: Negative for agitation, behavioral problems, decreased concentration, dysphoric mood, hallucinations, self-injury, sleep disturbance, suicidal ideas, negative for hyperactivity, depressed mood and stress. The patient is not nervous/anxious.        Objective   Physical Exam  Vitals and nursing note reviewed.   Constitutional:       Appearance: She is well-developed.   HENT:      Head: Normocephalic and atraumatic.   Eyes:      General:         Right eye: No discharge.         Left eye: No discharge.      Conjunctiva/sclera: Conjunctivae normal.   Neck:      Thyroid: No thyromegaly.   Cardiovascular:      Rate and Rhythm: Normal rate and regular rhythm.      Heart sounds: Normal heart sounds.   Pulmonary:      Effort: Pulmonary effort is normal.      Breath sounds: Normal breath sounds.   Musculoskeletal:         General: Normal range of motion.      Cervical back: Normal range of motion and neck supple.   Skin:     General: Skin is warm and dry.   Neurological:      Mental Status: She is alert and oriented to person, place, and time.   Psychiatric:         Mood and Affect: Mood normal.         Behavior: Behavior normal.         Thought Content:  Thought content normal.         Judgment: Judgment normal.         Assessment & Plan   Diagnoses and all orders for this visit:    1. DUB (dysfunctional uterine bleeding) (Primary)  Comments:  Patient continues to have irregular periods.  She was supposed to come in for PCOS labs over a year ago but did not return.  She will return for yearly checkup as soon as possible.  She will return for lab work as well, is not fasting today.  Orders:  -     Comprehensive Metabolic Panel  -     Cortisol  -     DHEA-Sulfate  -     Estradiol  -     Follicle Stimulating Hormone  -     Testosterone  -     Progesterone  -     Luteinizing Hormone  -     Insulin, Total  -     Hemoglobin A1c  -     TSH  -     T4, Free    2. Vitamin D deficiency  Comments:  Patient will have labs drawn.  Orders:  -     Vitamin D 25 Hydroxy         Class 3 Severe Obesity (BMI >=40). Obesity-related health conditions include the following: GERD. Obesity is unchanged. BMI is is above average; BMI management plan is completed. We discussed portion control and increasing exercise.      Mary Grace Costello, APRN  7/5/2022

## 2023-01-28 LAB
25(OH)D3+25(OH)D2 SERPL-MCNC: 9.2 NG/ML (ref 30–100)
ALBUMIN SERPL-MCNC: 4.4 G/DL (ref 3.5–5.2)
ALBUMIN/GLOB SERPL: 1.6 G/DL
ALP SERPL-CCNC: 120 U/L (ref 39–117)
ALT SERPL-CCNC: 15 U/L (ref 1–33)
AST SERPL-CCNC: 16 U/L (ref 1–32)
BILIRUB SERPL-MCNC: 0.2 MG/DL (ref 0–1.2)
BUN SERPL-MCNC: 11 MG/DL (ref 6–20)
BUN/CREAT SERPL: 18.6 (ref 7–25)
CALCIUM SERPL-MCNC: 9.5 MG/DL (ref 8.6–10.5)
CHLORIDE SERPL-SCNC: 107 MMOL/L (ref 98–107)
CO2 SERPL-SCNC: 26.7 MMOL/L (ref 22–29)
CORTIS SERPL-MCNC: 6.1 UG/DL
CREAT SERPL-MCNC: 0.59 MG/DL (ref 0.57–1)
DHEA-S SERPL-MCNC: 200 UG/DL (ref 84.8–378)
EGFRCR SERPLBLD CKD-EPI 2021: 128.4 ML/MIN/1.73
ESTRADIOL SERPL-MCNC: 54 PG/ML
FSH SERPL-ACNC: 5.3 MIU/ML
GLOBULIN SER CALC-MCNC: 2.8 GM/DL
GLUCOSE SERPL-MCNC: 90 MG/DL (ref 65–99)
HBA1C MFR BLD: 5.7 % (ref 4.8–5.6)
INSULIN SERPL-ACNC: 18.4 UIU/ML (ref 2.6–24.9)
LH SERPL-ACNC: 10.1 MIU/ML
POTASSIUM SERPL-SCNC: 4.5 MMOL/L (ref 3.5–5.2)
PROGEST SERPL-MCNC: 0.1 NG/ML
PROT SERPL-MCNC: 7.2 G/DL (ref 6–8.5)
SODIUM SERPL-SCNC: 140 MMOL/L (ref 136–145)
T4 FREE SERPL-MCNC: 0.96 NG/DL (ref 0.93–1.7)
TESTOST SERPL-MCNC: 47 NG/DL (ref 13–71)
TSH SERPL DL<=0.005 MIU/L-ACNC: 1.28 UIU/ML (ref 0.27–4.2)

## 2023-01-31 RX ORDER — SPIRONOLACTONE 25 MG/1
25 TABLET ORAL 2 TIMES DAILY
Qty: 60 TABLET | Refills: 3 | Status: SHIPPED | OUTPATIENT
Start: 2023-01-31

## 2023-01-31 RX ORDER — ERGOCALCIFEROL 1.25 MG/1
50000 CAPSULE ORAL 2 TIMES WEEKLY
Qty: 10 CAPSULE | Refills: 3 | Status: SHIPPED | OUTPATIENT
Start: 2023-02-02

## 2023-01-31 RX ORDER — ERGOCALCIFEROL 1.25 MG/1
50000 CAPSULE ORAL WEEKLY
Qty: 5 CAPSULE | Refills: 3 | Status: SHIPPED | OUTPATIENT
Start: 2023-01-31 | End: 2023-01-31 | Stop reason: SDUPTHER

## 2025-07-07 ENCOUNTER — APPOINTMENT (OUTPATIENT)
Dept: GENERAL RADIOLOGY | Facility: HOSPITAL | Age: 30
End: 2025-07-07
Payer: COMMERCIAL

## 2025-07-07 PROCEDURE — 87086 URINE CULTURE/COLONY COUNT: CPT | Performed by: NURSE PRACTITIONER

## 2025-07-07 PROCEDURE — 74018 RADEX ABDOMEN 1 VIEW: CPT
